# Patient Record
(demographics unavailable — no encounter records)

---

## 2024-11-04 NOTE — HISTORY OF PRESENT ILLNESS
[FreeTextEntry1] : 46 y/o presents for well woman visit. H/o endometriosis, on POPs. Had pelvic sono this summer, possible endometriosis implant? She has a follow up TEB with Dr. Steven next month. Otherwise feels well with no concerns, no changes in med/surgical history since last visit.  [Patient reported mammogram was normal] : Patient reported mammogram was normal [Patient reported PAP Smear was normal] : Patient reported PAP Smear was normal [Patient reported colonoscopy was normal] : Patient reported colonoscopy was normal [Mammogramdate] : 2024 [PapSmeardate] : 2021 [ColonoscopyDate] : 2024

## 2024-11-04 NOTE — PHYSICAL EXAM
[Chaperone Present] : A chaperone was present in the examining room during all aspects of the physical examination [[id number]] : A chaperone was present during the pelvic exam. [Appropriately responsive] : appropriately responsive [Alert] : alert [No Acute Distress] : no acute distress [Regular Rate Rhythm] : regular rate rhythm [Soft] : soft [Non-tender] : non-tender [Non-distended] : non-distended [No Mass] : no mass [Oriented x3] : oriented x3 [Examination Of The Breasts] : a normal appearance [No Masses] : no breast masses were palpable [Labia Majora] : normal [Labia Minora] : normal [Normal] : normal [FreeTextEntry6] : Difficult to appreciate due to habitus

## 2024-11-04 NOTE — PLAN
[FreeTextEntry1] : 48 y/o for well woman visit  Plan: - Pap test today - referral for mammogram; encouraged yearly breast MRI for elevated TC risk as well - RTO for annual or PRN

## 2024-12-11 NOTE — DISCUSSION/SUMMARY
[FreeTextEntry1] : 48 yo P1 here w/ongoing abd/pelvic discomfort w/h/o endo. Now on norethindrone 5 mg daily. Spoke to pt about options for intervention. Questions answered.  Plan USG and see me Thursday 2/20/25. Change to Norethindrone acetate 5 mg BID

## 2024-12-11 NOTE — HISTORY OF PRESENT ILLNESS
[Medical Office: ([facility])___] : at the medical office located in  [FreeTextEntry1] : -   48 yo P1 here w/pulling sensation in her vagina, back pain, and lower abd pain. HMB lasting 7 days. Pain not as severe as it was in the past. Still taking norethindrone 5 mg daily. Has noticed vasomotor symptoms.    Recap 48 yo P1 w/pelvic discomfort of uncertain etiology. Pt w/h/o lsc LO cystectomy and lt salpingectomy 3/18/22 for endo. Now concerned about recurrence. Still taking norethindrone 5 mg daily.

## 2025-03-19 NOTE — HISTORY OF PRESENT ILLNESS
[Other Location: e.g. School (Enter Location, City,State)___] : at [unfilled], at the time of the visit. [Medical Office: ([facility])___] : at the medical office located in  [Telehealth (audio & video)] : This visit was provided via telehealth using real-time 2-way audio visual technology. [FreeTextEntry1] : 48 yo P1 here for f/u   Pt stopped norethindrone acetate 5 mg BID after 3 wks, secondary to headaches. Menses returned, so she tried one pill daily.  Would like to go back to norethindrone 0.35 mg.  Recap 48 yo P1 here w/ongoing abd/pelvic discomfort w/h/o endo. Now on norethindrone 5 mg daily. Spoke to pt about options for intervention. Questions answered.  Plan USG and see me Thursday 2/20/25. Change to Norethindrone acetate 5 mg BID.

## 2025-03-19 NOTE — DISCUSSION/SUMMARY
[FreeTextEntry1] : 46 yo P1 here for f/u after adjusting her meds for endo management. Pt stopped norethindrone acetate 5 mg BID after 3 wks, secondary to headaches. Menses returned, so she tried one pill daily. Would like to go back to norethindrone 0.35 mg. Options reviewed. Questions answered.  Plan Will send script for norethindrone 0.35 mg TEB x 6-8 wks